# Patient Record
Sex: MALE | Race: WHITE
[De-identification: names, ages, dates, MRNs, and addresses within clinical notes are randomized per-mention and may not be internally consistent; named-entity substitution may affect disease eponyms.]

---

## 2018-09-26 ENCOUNTER — HOSPITAL ENCOUNTER (OUTPATIENT)
Dept: HOSPITAL 47 - RADUSMAIN | Age: 53
Discharge: HOME | End: 2018-09-26
Attending: PHYSICIAN ASSISTANT
Payer: COMMERCIAL

## 2018-09-26 DIAGNOSIS — N50.89: Primary | ICD-10-CM

## 2018-09-26 PROCEDURE — 76870 US EXAM SCROTUM: CPT

## 2018-09-26 PROCEDURE — 93975 VASCULAR STUDY: CPT

## 2018-09-27 NOTE — US
EXAMINATION TYPE: US scrotum with doppler.  Grayscale and color Doppler Duplex imaging performed of t
he scrotum.

 

DATE OF EXAM: 9/26/2018

 

COMPARISON: NONE

 

CLINICAL HISTORY: N50.89 Other specified disorders. Left teste mass.  No surgeries.  No recent injury
. 

 

 

EXAM MEASUREMENTS:

 

TESTICLES:

Right Testicle:  4.8 x 3.5 x 2.9 cm

Left Testicle:  5.1 x 3.3 x 3.0 cm

 

EPIDIDYMIS HEAD:

Right Epididymis:  1.1 x 1.1 x 0.7 cm

Left Epididymis:  1.3 x 1.0 x 1.0 cm  

 

Doppler performed to assess for testicular vascularity; good bilateral color flow and waveforms are s
een.   There is no evidence of testicular torsion.

 

Presence of hydroceles:  right

Presence of varicoceles:  left, valsalva performed

 

Left teste palpable mass scanned.  Vascular mass seen lateral to teste = 1.8 x 2.2 x 1.7 cm.  

 

 

 

IMPRESSION: 

1. There is a vascular nonspecific solid mass just lateral to the left testicle measuring 2.2 cm. Uro
logy consultation recommended.

## 2021-07-13 ENCOUNTER — HOSPITAL ENCOUNTER (OUTPATIENT)
Dept: HOSPITAL 47 - RADUSWWP | Age: 56
Discharge: HOME | End: 2021-07-13
Attending: FAMILY MEDICINE
Payer: COMMERCIAL

## 2021-07-13 DIAGNOSIS — N50.89: Primary | ICD-10-CM

## 2021-07-13 PROCEDURE — 93975 VASCULAR STUDY: CPT

## 2021-07-13 PROCEDURE — 76870 US EXAM SCROTUM: CPT

## 2021-07-13 NOTE — US
EXAMINATION TYPE: US scrotum with doppler.  Grayscale and color Doppler Duplex imaging performed of t
he scrotum.

 

DATE OF EXAM: 7/13/2021

 

COMPARISON: 9/26/2018

 

CLINICAL HISTORY: N50.819 Testicular pain, unspecified. Palpable lump right testicle. Patient had a b
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  

 

 

EXAM MEASUREMENTS:

 

TESTICLES:

Right Testicle:  5.6 x 2.6 x 3.4 cm

Left Testicle:  4.9 x 2.5 x 2.9 cm

 

EPIDIDYMIS HEAD:

Right Epididymis:  1.2 cm

Left Epididymis:  Unable to visualized 

 

Doppler performed to assess for testicular vascularity; good bilateral color flow and waveforms are s
een.   There is no evidence of testicular torsion.

 

Presence of hydroceles:  No

Presence of varicoceles:  No

 

At the area of the patient's palpable lump inferior to the right testicle, there is a complex, vascul
ar area visualized measuring 1.8 x 1.2 x 1.7 cm. Unable to visualized left epididymis, left testicle 
appears heterogeneous 

 

 

 

IMPRESSION:

1. 1.8 cm heterogeneous lesion with internal vascularity on color Doppler imaging inferior to the rig
ht testicle is indeterminant on this examination. Urology consultation would be helpful.

2. Left epididymis is not seen.

## 2021-12-29 ENCOUNTER — HOSPITAL ENCOUNTER (OUTPATIENT)
Dept: HOSPITAL 47 - RADUSWWP | Age: 56
Discharge: HOME | End: 2021-12-29
Attending: FAMILY MEDICINE
Payer: COMMERCIAL

## 2021-12-29 DIAGNOSIS — R59.0: Primary | ICD-10-CM

## 2021-12-29 NOTE — US
EXAMINATION TYPE: US axilla RT

 

DATE OF EXAM: 12/29/2021

 

COMPARISON: NONE

 

CLINICAL HISTORY: R59.0 LOCALIZED ENLARGED LYMPH NODES. Right axilla mass for the past 2 weeks

 

Right axilla hypoechoic area seen in palpable area measuring 0.6 x 0.4 x 0.8 cm, multiple lymph nodes
 seen largest measuring 2.3 x 1.8 x 1.5 cm  

 

 

 

 

 

 

IMPRESSION:  Probable right axillary adenopathy. Correlate clinically.

## 2022-03-09 ENCOUNTER — HOSPITAL ENCOUNTER (OUTPATIENT)
Dept: HOSPITAL 47 - ORWHC2ENDO | Age: 57
Discharge: HOME | End: 2022-03-09
Attending: SURGERY
Payer: COMMERCIAL

## 2022-03-09 VITALS — SYSTOLIC BLOOD PRESSURE: 124 MMHG | DIASTOLIC BLOOD PRESSURE: 76 MMHG | RESPIRATION RATE: 18 BRPM

## 2022-03-09 VITALS — TEMPERATURE: 97.4 F

## 2022-03-09 VITALS — HEART RATE: 62 BPM

## 2022-03-09 VITALS — BODY MASS INDEX: 30.7 KG/M2

## 2022-03-09 DIAGNOSIS — D12.2: ICD-10-CM

## 2022-03-09 DIAGNOSIS — K64.0: ICD-10-CM

## 2022-03-09 DIAGNOSIS — K57.30: ICD-10-CM

## 2022-03-09 DIAGNOSIS — D12.3: ICD-10-CM

## 2022-03-09 DIAGNOSIS — Z79.899: ICD-10-CM

## 2022-03-09 DIAGNOSIS — Z80.0: ICD-10-CM

## 2022-03-09 DIAGNOSIS — Z88.1: ICD-10-CM

## 2022-03-09 DIAGNOSIS — Z91.02: ICD-10-CM

## 2022-03-09 DIAGNOSIS — Z87.891: ICD-10-CM

## 2022-03-09 DIAGNOSIS — Z80.9: ICD-10-CM

## 2022-03-09 DIAGNOSIS — Z98.890: ICD-10-CM

## 2022-03-09 DIAGNOSIS — Z91.013: ICD-10-CM

## 2022-03-09 DIAGNOSIS — Z12.11: Primary | ICD-10-CM

## 2022-03-09 DIAGNOSIS — I10: ICD-10-CM

## 2022-03-09 PROCEDURE — 45380 COLONOSCOPY AND BIOPSY: CPT

## 2022-03-09 PROCEDURE — 45385 COLONOSCOPY W/LESION REMOVAL: CPT

## 2022-03-09 PROCEDURE — 88305 TISSUE EXAM BY PATHOLOGIST: CPT

## 2022-03-09 RX ADMIN — POTASSIUM CHLORIDE SCH MLS: 14.9 INJECTION, SOLUTION INTRAVENOUS at 09:02

## 2022-03-09 RX ADMIN — POTASSIUM CHLORIDE SCH MLS: 14.9 INJECTION, SOLUTION INTRAVENOUS at 09:37

## 2022-03-09 NOTE — P.GSHP
History of Present Illness


H&P Date: 03/09/22














CHIEF COMPLAINT: Colon screen





HISTORY OF PRESENT ILLNESS: The patient is a 56-year-old male who


presents for colon screen.  Lower endoscopy was offered for further evaluation 

and management.





PAST MEDICAL HISTORY: 


Please see list.





PAST SURGICAL HISTORY: 


Please see list.





MEDICATIONS: 


Please see list.





ALLERGIES:  Please see list. 





SOCIAL HISTORY: No illicit drug use





FAMILY HISTORY: No reports of Crohn disease or ulcerative colitis. 





REVIEW OF ORGAN SYSTEMS: 


CONSTITUTIONAL: No reports of fevers or chills.  





PHYSICAL EXAM: 


VITAL SIGNS:  Stable


GENERAL: Well-developed pleasant in no acute distress. 


HEENT: No scleral icterus. Extraocular movements grossly


intact. Moist buccal mucosa. 


NECK: Supple without lymphadenopathy. 


CHEST: Unlabored respirations. Equal bilateral excursions. 


CARDIOVASCULAR: Regular rate and rhythm. Distal 2+ pulses. 


ABDOMEN: Soft, nontender, nondistended. 


MUSCULOSKELETAL: No clubbing, cyanosis, or edema. 





ASSESSMENT: 


1.  Colon screen.





PLAN: 


1. Recommend proceeding with a lower endoscopy





Past Medical History


Past Medical History: Hypertension


Additional Past Medical History / Comment(s): HX OF TACHYCARDIA, HX OF VERTIGO,


History of Any Multi-Drug Resistant Organisms: None Reported


Past Surgical History: Hernia Repair


Additional Past Surgical History / Comment(s): COLONOSCOPY , benign mass removed

from testicle x 2


Past Anesthesia/Blood Transfusion Reactions: No Reported Reaction


Smoking Status: Former smoker





- Past Family History


  ** Mother


Family Medical History: Cancer





Medications and Allergies


                                Home Medications











 Medication  Instructions  Recorded  Confirmed  Type


 


Diltiazem Cd [Cardizem CD] 180 mg PO DAILY 02/11/15 03/07/22 History


 


Fenofibrate [Lofibra] 160 mg PO DAILY 02/11/15 03/07/22 History


 


Dapsone 50 mg PO DAILY 03/07/22 03/07/22 History


 


Lisinopril-Hctz 20-25 mg 1 tab PO DAILY 03/07/22 03/07/22 History





[Zestoretic 20-25]    








                                    Allergies











Allergy/AdvReac Type Severity Reaction Status Date / Time


 


cefuroxime axetil Allergy  Nausea & Verified 03/07/22 15:26





[From Ceftin]   Vomiting  


 


gluten Allergy  Unknown Verified 03/07/22 15:26


 


shrimp Allergy  Unknown Verified 03/07/22 15:26

## 2022-03-09 NOTE — P.PCN
Date of Procedure: 03/09/22


Description of Procedure: 








PREOPERATIVE DIAGNOSIS:


Family history malignant colon polyps


Colonoscopy screening





POSTOPERATIVE DIAGNOSIS:


Tubular adenoma ascending colon


Tubular adenoma transverse colon


Sigmoid diverticulosis





OPERATION:


Colonoscopy to the ileocecal valve and appendiceal orifice, cecum


Colonoscopy with hot snare polypectomy


Colonoscopy with cold forceps biopsy





SURGEON: Marisel Khan MD.





ANESTHESIA: MAC.





INDICATIONS:


The patient is an 56-year-old male who presents family history of malignant 

colon polyps.  First colonoscopy screening.  Benefits and risks were described 

and informed consent was obtained.





DESCRIPTION OF PROCEDURE:


The patient had undergone Sutab prep.  The patient had been brought into the 

operating room and laid in the left lateral decubitus position.  After adequate 

intravenous sedation, the rectum was examined with 2% lidocaine jelly. The 

prostate was unremarkable.  No external hemorrhoids were encountered. The rectal

tone was within normal limits. No lesions were palpated in the rectal vault. An 

Olympus colonoscope was advanced until the cecum, ileocecal valve and 

appendiceal orifice were clearly viewed.  The prep was good.  Sigmoid 

diverticulosis was encountered.  Colonic polyps were found and removed.  No 

evidence of focal colitis was found. Retroflexion of the scope demonstrated 

grade 1 internal hemorrhoids without active bleeding or inflammation. The colon 

was desufflated. The patient had tolerated the procedure well. 





Withdrawal time was over 6 minutes.





FINDINGS:


Aronchick preparation quality scale 2 (1-5)


Internal hemorrhoids, grade 1


No external hemorrhoids.


No arteriovenous malformations.


Sigmoid diverticulosis


Removal of 2 polyps:


- Snare polypectomy mid transverse colon, 8 mm tubulovillous adenoma polyp.


- Cold forceps biopsy of ascending colon, 4 mm polyp.


No focal colitis.





RECOMMENDATIONS:


Repeat colonoscopy in 3 years, 2025





Plan - Discharge Summary


New Discharge Prescriptions: 


Continue


   Fenofibrate [Lofibra] 160 mg PO DAILY


   Diltiazem Cd [Cardizem CD] 180 mg PO DAILY


   Dapsone 50 mg PO DAILY


   Lisinopril-Hctz 20-25 mg [Zestoretic 20-25] 1 tab PO DAILY


Discharge Medication List





Diltiazem Cd [Cardizem CD] 180 mg PO DAILY 02/11/15 [History]


Fenofibrate [Lofibra] 160 mg PO DAILY 02/11/15 [History]


Dapsone 50 mg PO DAILY 03/07/22 [History]


Lisinopril-Hctz 20-25 mg [Zestoretic 20-25] 1 tab PO DAILY 03/07/22 [History]








Follow up Appointment(s)/Referral(s): 


Marisel Khan MD [STAFF PHYSICIAN] - As Needed


Patient Instructions/Handouts:  *[facility] - (Anesthesia) Endoscopy Discharge 

Instructions, Diverticulosis (DC), Colorectal Polyps (GEN), Diverticulosis Diet 

(GEN), Colonoscopy (DC), Advance Directives (DC)


Activity/Diet/Wound Care/Special Instructions: 


Repeat colonoscopy in 3 years, 2025


Discharge Disposition: HOME SELF-CARE